# Patient Record
Sex: FEMALE | NOT HISPANIC OR LATINO | Employment: OTHER | URBAN - METROPOLITAN AREA
[De-identification: names, ages, dates, MRNs, and addresses within clinical notes are randomized per-mention and may not be internally consistent; named-entity substitution may affect disease eponyms.]

---

## 2018-05-21 PROBLEM — F31.9 BIPOLAR DISORDER: Status: ACTIVE | Noted: 2018-05-21

## 2018-05-21 PROBLEM — I10 HTN (HYPERTENSION): Status: ACTIVE | Noted: 2018-05-21

## 2018-05-21 PROBLEM — E78.2 MIXED HYPERLIPIDEMIA: Status: ACTIVE | Noted: 2018-05-21

## 2018-05-21 PROBLEM — R42 DIZZINESS: Status: ACTIVE | Noted: 2018-05-21

## 2018-05-21 PROBLEM — K21.9 GERD (GASTROESOPHAGEAL REFLUX DISEASE): Status: ACTIVE | Noted: 2018-05-21

## 2018-05-21 PROBLEM — E66.9 CLASS 2 OBESITY IN ADULT: Status: ACTIVE | Noted: 2018-05-21

## 2018-08-31 ENCOUNTER — TRANSCRIBE ORDERS (OUTPATIENT)
Dept: ADMINISTRATIVE | Facility: HOSPITAL | Age: 58
End: 2018-08-31

## 2018-08-31 DIAGNOSIS — R42 DIZZINESS: Primary | ICD-10-CM

## 2018-09-26 ENCOUNTER — HOSPITAL ENCOUNTER (OUTPATIENT)
Dept: CARDIOLOGY | Facility: HOSPITAL | Age: 58
Discharge: HOME | End: 2018-09-26
Admitting: FAMILY MEDICINE

## 2018-09-26 VITALS — SYSTOLIC BLOOD PRESSURE: 132 MMHG | DIASTOLIC BLOOD PRESSURE: 74 MMHG | HEART RATE: 91 BPM

## 2018-09-26 DIAGNOSIS — R42 DIZZINESS: ICD-10-CM

## 2018-09-26 LAB — BH CV TILT AGENT USED: NORMAL

## 2018-09-26 PROCEDURE — 93660 TILT TABLE EVALUATION: CPT | Performed by: INTERNAL MEDICINE

## 2018-09-26 PROCEDURE — 93660 TILT TABLE EVALUATION: CPT

## 2018-09-26 RX ORDER — HYDROCODONE BITARTRATE AND ACETAMINOPHEN 10; 325 MG/1; MG/1
1 TABLET ORAL EVERY 6 HOURS PRN
COMMUNITY

## 2018-09-26 RX ORDER — ATORVASTATIN CALCIUM 10 MG/1
80 TABLET, FILM COATED ORAL DAILY
COMMUNITY

## 2018-09-26 RX ORDER — FLUOXETINE HYDROCHLORIDE 20 MG/1
40 CAPSULE ORAL DAILY
COMMUNITY

## 2018-09-26 RX ORDER — LORAZEPAM 2 MG/1
2 TABLET ORAL EVERY 6 HOURS PRN
COMMUNITY
End: 2018-11-15

## 2018-09-26 RX ORDER — NITROGLYCERIN 400 UG/1
1 SPRAY ORAL
Status: DISCONTINUED | OUTPATIENT
Start: 2018-09-26 | End: 2018-09-27 | Stop reason: HOSPADM

## 2018-09-26 RX ORDER — RANITIDINE 150 MG/1
300 TABLET ORAL NIGHTLY
COMMUNITY

## 2018-09-26 RX ADMIN — NITROGLYCERIN 1 SPRAY: 400 SPRAY, METERED ORAL at 09:19

## 2018-11-14 PROBLEM — Z72.0 TOBACCO ABUSE: Status: ACTIVE | Noted: 2018-11-14

## 2018-11-14 PROBLEM — I10 HTN (HYPERTENSION): Status: RESOLVED | Noted: 2018-05-21 | Resolved: 2018-11-14

## 2018-11-14 NOTE — PROGRESS NOTES
Subjective   Katy Ward is a 58 y.o. female.  Brian Lynn MD Rowe, Brian Levin MD  [address] [id number]      Chief Complaint   Patient presents with   • Loss of Consciousness       Patient Active Problem List    Diagnosis Date Noted   • Syncope and collapse 05/21/2018     Priority: High     Note Last Updated: 11/15/2018     1.  Echo 4-24-17: Normal EF, normal valvular morphology    2.  Carotid duplex study 4-24-17, no significant disease    3.  Tilt table, positive vasopressor head up tilt table. 9-26-18         • HTN (hypertension) 05/21/2018     Priority: Medium   • Mixed hyperlipidemia 05/21/2018     Priority: Medium   • Tobacco abuse 11/14/2018     Priority: Low   • Class 2 obesity in adult 05/21/2018     Priority: Low   • Depressed 11/15/2018   • Anxiety 11/15/2018   • History of breast cancer 11/15/2018     Note Last Updated: 11/15/2018     Left mastectomy, chemo and radiation. With Reconstruction     • Bipolar disorder (CMS/HCC) 05/21/2018   • GERD (gastroesophageal reflux disease) 05/21/2018       Patient Active Problem List    Diagnosis   • Syncope and collapse [R55]   • HTN (hypertension) [I10]   • Mixed hyperlipidemia [E78.2]   • Tobacco abuse [Z72.0]   • Class 2 obesity in adult [E66.9]   • Depressed [F32.9]   • Anxiety [F41.9]   • History of breast cancer [Z85.3]   • Bipolar disorder (CMS/HCC) [F31.9]   • GERD (gastroesophageal reflux disease) [K21.9]        History of Present Illness   This is a 58-year-old hypertensive dyslipidemic female smoker with no significant prior cardiac history.  She has a long history of syncope and presyncope.  She had a stress test in 2013 that was unremarkable.  Last year she had an echocardiogram which is unremarkable.  She had a carotid duplex study which was unremarkable.  Recently she was sent by primary care for a tilt table study which was positive.  Labs from primary care are reviewed and reveal normal electrolytes and normal  hemoglobin and hematocrit.  Thyroid function is undocumented.  She describes episodes on a near daily basis of near syncope which she describes a sudden onset of dizziness, nausea, anxious feeling, tingling in the hands and heart racing.  She states she has chairs placed strategically around her house for her to sit in and rest.  These spells last for approximately 5 minutes.  She does not check her blood pressure at home.  Her last primary care note is reviewed and shows a resting heart rate of 98 bpm with blood pressure 124/86 and oxygen saturation 97%.  She denies orthopnea, PND, claudication, lower extremity edema.  Past Surgical History:   Procedure Laterality Date   • BREAST RECONSTRUCTION Left    • HYSTERECTOMY     • MASTECTOMY Left     cancer   • TUBAL ABDOMINAL LIGATION         The following portions of the patient's history were reviewed and updated as appropriate: allergies, current medications, past family history, past medical history, past social history, past surgical history and problem list.    Allergies   Allergen Reactions   • Codeine Itching     Itching.   • Phenergan [Promethazine Hcl] Anxiety     Hyper, itching.         Current Outpatient Medications:   •  amitriptyline (ELAVIL) 100 MG tablet, Take 100 mg by mouth 2 (Two) Times a Day., Disp: , Rfl:   •  atorvastatin (LIPITOR) 10 MG tablet, Take 80 mg by mouth Daily., Disp: , Rfl:   •  FLUoxetine (PROzac) 20 MG capsule, Take 40 mg by mouth Daily., Disp: , Rfl:   •  HYDROcodone-acetaminophen (NORCO)  MG per tablet, Take 1 tablet by mouth Every 6 (Six) Hours As Needed for Moderate Pain ., Disp: , Rfl:   •  lamoTRIgine (LaMICtal) 100 MG tablet, Take 150 mg by mouth 2 (Two) Times a Day., Disp: , Rfl:   •  omeprazole (priLOSEC) 40 MG capsule, Take 40 mg by mouth Daily., Disp: , Rfl:   •  ramipril (ALTACE) 10 MG capsule, Take 10 mg by mouth Daily., Disp: , Rfl:   •  raNITIdine (ZANTAC) 150 MG tablet, Take 300 mg by mouth Every Night., Disp: ,  Rfl:     Review of Systems   Constitution: Negative. Negative for diaphoresis, weakness, malaise/fatigue and weight gain.   HENT: Negative.    Eyes: Negative.    Cardiovascular: Negative for chest pain, claudication, dyspnea on exertion, irregular heartbeat, leg swelling, orthopnea, palpitations, paroxysmal nocturnal dyspnea and syncope.   Respiratory: Negative.  Negative for cough, shortness of breath, sleep disturbances due to breathing and wheezing.    Endocrine: Positive for heat intolerance and polyuria.   Hematologic/Lymphatic: Negative.  Negative for bleeding problem.   Skin: Negative.    Musculoskeletal: Negative.  Negative for muscle cramps, muscle weakness and myalgias.   Gastrointestinal: Negative.  Negative for heartburn.   Genitourinary: Negative.    Neurological: Negative.    Psychiatric/Behavioral: Positive for depression and memory loss.   Allergic/Immunologic: Negative.    All other systems reviewed and are negative.      Social History     Socioeconomic History   • Marital status:      Spouse name: Not on file   • Number of children: Not on file   • Years of education: Not on file   • Highest education level: Not on file   Social Needs   • Financial resource strain: Not on file   • Food insecurity - worry: Not on file   • Food insecurity - inability: Not on file   • Transportation needs - medical: Not on file   • Transportation needs - non-medical: Not on file   Occupational History   • Not on file   Tobacco Use   • Smoking status: Current Every Day Smoker     Packs/day: 1.00     Types: Cigarettes   • Smokeless tobacco: Never Used   Substance and Sexual Activity   • Alcohol use: No   • Drug use: No   • Sexual activity: Defer   Other Topics Concern   • Not on file   Social History Narrative   • Not on file       Family History   Problem Relation Age of Onset   • Heart disease Mother    • Heart disease Father    • Stroke Sister    • Hypertension Brother    • Heart disease Maternal Grandmother  "   • Heart disease Maternal Grandfather    • Heart disease Paternal Grandmother    • Heart disease Paternal Grandfather    • Hypertension Brother    • Arrhythmia Sister    • Hypertension Sister        Objective      Blood pressure 138/80, pulse 111, height 160 cm (63\"), weight 91.2 kg (201 lb).    Physical Exam   Constitutional: She is oriented to person, place, and time. She appears well-developed and well-nourished. No distress.   HENT:   Head: Normocephalic and atraumatic.   Mouth/Throat: Oropharynx is clear and moist.   Eyes: Pupils are equal, round, and reactive to light. No scleral icterus.   Neck: Neck supple. No JVD present. No tracheal deviation present. No thyromegaly present.   Cardiovascular: Normal rate, regular rhythm and normal heart sounds. Exam reveals no gallop and no friction rub.   No murmur heard.  Pulmonary/Chest: Effort normal and breath sounds normal. No respiratory distress. She has no wheezes. She has no rales.   Abdominal: Soft. Bowel sounds are normal. She exhibits no distension and no mass. There is no tenderness. There is no rebound and no guarding.   Musculoskeletal: Normal range of motion. She exhibits no edema or deformity.   Lymphadenopathy:     She has no cervical adenopathy.   Neurological: She is alert and oriented to person, place, and time. No cranial nerve deficit.   Skin: Skin is warm and dry. No rash noted. She is not diaphoretic.   Psychiatric: She has a normal mood and affect.   Nursing note and vitals reviewed.        ECG 12 Lead  Date/Time: 11/14/2018 3:01 PM  Performed by: Jordan Clayton MD  Authorized by: Jordan Clayton MD   Previous ECG: no previous ECG available  Rhythm: sinus tachycardia  Rate: normal  Conduction: incomplete RBBB  ST Segments: ST segments normal  T Waves: T waves normal  QRS axis: normal  Clinical impression: abnormal ECG  Comments: Nonspecific T wave changes  Rate 111            Lab Review: From primary care notes dated June 28, 2018  Fasting lipid " panel, total cholesterol 185, triglycerides 211, HDL 44, LDL 99      Assessment:     Diagnosis Plan   1. Syncope and collapse  She is instructed to increase her water intake     Holter Monitor - 48 Hour   2. Essential hypertension  Discontinue Altace    3. Mixed hyperlipidemia     4. Tachycardia  Toprol-XL 25 mg daily       Plan:  Patient with episodes of near syncope which she is able to manage by sitting and reclining.  She had a positive tilt table test for vasodepressive component.  Her previous cardiac workup including stress test and echocardiogram was unremarkable.  This time I have recommended maintenance of good hydration.  We will discontinue ramipril and start her on Toprol-XL 25 mg daily to address the reflex component of near syncope.  Cardiac monitor to assess for any possible arrhythmias.  Continue current medications.   Follow-up in 3 months at [facility], sooner as needed.  Thank you for allowing us to participate in the care of your patient.     Scribed for Jordan Clayton MD by Adam Patel PA-C. 11/15/2018  12:17 PM     IJordan MD, personally performed the services described in this documentation as scribed by the above named individual in my presence, and it is both accurate and complete.  11/15/2018  1:23 PM

## 2018-11-15 ENCOUNTER — CONSULT (OUTPATIENT)
Dept: CARDIOLOGY | Facility: CLINIC | Age: 58
End: 2018-11-15

## 2018-11-15 VITALS
SYSTOLIC BLOOD PRESSURE: 138 MMHG | HEART RATE: 111 BPM | HEIGHT: 63 IN | BODY MASS INDEX: 35.61 KG/M2 | DIASTOLIC BLOOD PRESSURE: 80 MMHG | WEIGHT: 201 LBS

## 2018-11-15 DIAGNOSIS — R55 SYNCOPE AND COLLAPSE: Primary | ICD-10-CM

## 2018-11-15 DIAGNOSIS — E78.2 MIXED HYPERLIPIDEMIA: ICD-10-CM

## 2018-11-15 DIAGNOSIS — I10 ESSENTIAL HYPERTENSION: ICD-10-CM

## 2018-11-15 DIAGNOSIS — R00.0 TACHYCARDIA: ICD-10-CM

## 2018-11-15 PROBLEM — F32.A DEPRESSED: Status: ACTIVE | Noted: 2018-11-15

## 2018-11-15 PROBLEM — F41.9 ANXIETY: Status: ACTIVE | Noted: 2018-11-15

## 2018-11-15 PROBLEM — Z85.3 HISTORY OF BREAST CANCER: Status: ACTIVE | Noted: 2018-11-15

## 2018-11-15 PROBLEM — C80.1 CANCER: Status: ACTIVE | Noted: 2018-11-15

## 2018-11-15 PROCEDURE — 99204 OFFICE O/P NEW MOD 45 MIN: CPT | Performed by: INTERNAL MEDICINE

## 2018-11-15 RX ORDER — RAMIPRIL 10 MG/1
10 CAPSULE ORAL DAILY
COMMUNITY
End: 2018-11-15

## 2018-11-15 RX ORDER — OMEPRAZOLE 40 MG/1
40 CAPSULE, DELAYED RELEASE ORAL DAILY
COMMUNITY

## 2018-11-15 RX ORDER — AMITRIPTYLINE HYDROCHLORIDE 100 MG/1
100 TABLET, FILM COATED ORAL 2 TIMES DAILY
COMMUNITY

## 2018-11-15 RX ORDER — LAMOTRIGINE 100 MG/1
150 TABLET ORAL 2 TIMES DAILY
COMMUNITY

## 2018-11-15 RX ORDER — METOPROLOL SUCCINATE 25 MG/1
25 TABLET, EXTENDED RELEASE ORAL DAILY
Qty: 30 TABLET | Refills: 11 | Status: SHIPPED | OUTPATIENT
Start: 2018-11-15

## 2018-11-16 ENCOUNTER — TELEPHONE (OUTPATIENT)
Dept: CARDIOLOGY | Facility: CLINIC | Age: 58
End: 2018-11-16

## 2018-12-06 NOTE — TELEPHONE ENCOUNTER
Patient never called back with blood pressure reading as instructed to do and I have tried to call her and left messages to call with no return.   Also she was a no show for a fu.

## 2019-02-19 ENCOUNTER — OFFICE VISIT (OUTPATIENT)
Dept: CARDIOLOGY | Facility: CLINIC | Age: 59
End: 2019-02-19

## 2019-02-19 VITALS
DIASTOLIC BLOOD PRESSURE: 77 MMHG | WEIGHT: 201 LBS | SYSTOLIC BLOOD PRESSURE: 120 MMHG | HEIGHT: 63 IN | HEART RATE: 83 BPM | BODY MASS INDEX: 35.61 KG/M2

## 2019-02-19 DIAGNOSIS — I10 ESSENTIAL HYPERTENSION: ICD-10-CM

## 2019-02-19 DIAGNOSIS — R55 SYNCOPE AND COLLAPSE: Primary | ICD-10-CM

## 2019-02-19 DIAGNOSIS — E78.2 MIXED HYPERLIPIDEMIA: ICD-10-CM

## 2019-02-19 PROCEDURE — 99214 OFFICE O/P EST MOD 30 MIN: CPT | Performed by: INTERNAL MEDICINE

## 2019-02-19 RX ORDER — FLUDROCORTISONE ACETATE 0.1 MG/1
0.1 TABLET ORAL DAILY
Qty: 90 TABLET | Refills: 3 | Status: SHIPPED | OUTPATIENT
Start: 2019-02-19 | End: 2020-02-21

## 2019-02-19 NOTE — PROGRESS NOTES
"        Encounter Date:02/19/2019      Patient ID: Katy Ward is a 58 y.o. female.    Chief Complaint: Dizziness and Syncope      PROBLEM LIST:  1. Syncope and collapse:  a. Echo, 04/24/17: Normal EF, normal valvular morphology  b. Carotid duplex study 4-24-17, no significant disease  c. Tilt table, positive vasopressor head up tilt table. 9-26-18  d. 48h Holter monitor, 11/15/2018: SR with occasional PAC's.  2. Hypertension.  3. Hyperlipidemia.  4. Tobacco abuse.  5. Class 2 obesity in adult.  6. Depression/anxiety.  7. Bipolar disorder.  8. H/o breast cancer:  a. Left mastectomy, chemo and radiation. With reconstruction.  9. GERD.    History of Present Illness  Patient presents today for 3 month follow-up with a history of syncope and cardiac risk factors. Since last visit, she has been experiencing \"good days and bad days\", with some severe intermittent dizziness. Denies chest pain, shortness of breath, leg swelling, palpitations, and syncope. She feels that metoprolol has not been helping with her symptoms.    Allergies   Allergen Reactions   • Codeine Itching     Itching.   • Phenergan [Promethazine Hcl] Anxiety     Hyper, itching.         Current Outpatient Medications:   •  amitriptyline (ELAVIL) 100 MG tablet, Take 100 mg by mouth 2 (Two) Times a Day., Disp: , Rfl:   •  atorvastatin (LIPITOR) 10 MG tablet, Take 80 mg by mouth Daily., Disp: , Rfl:   •  FLUoxetine (PROzac) 20 MG capsule, Take 40 mg by mouth Daily., Disp: , Rfl:   •  HYDROcodone-acetaminophen (NORCO)  MG per tablet, Take 1 tablet by mouth Every 6 (Six) Hours As Needed for Moderate Pain ., Disp: , Rfl:   •  lamoTRIgine (LaMICtal) 100 MG tablet, Take 150 mg by mouth 2 (Two) Times a Day., Disp: , Rfl:   •  metoprolol succinate XL (TOPROL-XL) 25 MG 24 hr tablet, Take 1 tablet by mouth Daily., Disp: 30 tablet, Rfl: 11  •  omeprazole (priLOSEC) 40 MG capsule, Take 40 mg by mouth Daily., Disp: , Rfl:   •  raNITIdine (ZANTAC) 150 MG tablet, " "Take 300 mg by mouth Every Night., Disp: , Rfl:     The following portions of the patient's history were reviewed and updated as appropriate: allergies, current medications, past family history, past medical history, past social history, past surgical history and problem list.    ROS  Review of Systems   Constitution: Negative for chills, fatigue, fever, generalized weakness, weight gain and weight loss.   Cardiovascular: Negative for chest pain, claudication, dyspnea on exertion, leg swelling, orthopnea, palpitations, and paroxysmal nocturnal dyspnea.    Respiratory: Negative for cough, shortness of breath, snoring, and wheezing.  HENT: Negative for ear pain, nosebleeds, and tinnitus.  Gastrointestinal: Negative for abdominal pain, constipation, diarrhea, nausea and vomiting.   Genitourinary: No urinary symptoms   Neurological: Negative for headaches, loss of balance, numbness, and symptoms of stroke. Positive for dizziness.  Psychiatric: Normal mental status.     All other systems reviewed and are negative.    Objective:     /77 (BP Location: Right arm, Patient Position: Sitting)   Pulse 83   Ht 160 cm (63\")   Wt 91.2 kg (201 lb)   BMI 35.61 kg/m²        Physical Exam  Constitutional: Patient appears well-developed and well-nourished.   HENT: HEENT exam unremarkable.   Neck: Neck supple. No JVD present. No carotid bruits.   Cardiovascular: Normal rate, regular rhythm and normal heart sounds. No murmur heard.   2+ symmetric pulses.   Pulmonary/Chest: Breath sounds normal. Does not exhibit tenderness.   Abdominal: Abdomen benign.   Musculoskeletal: Does not exhibit edema.   Neurological: Neurological exam unremarkable.   Vitals reviewed.    Lab Review:     Last lipid panel, 06/28/2018:  Chol 185  Trig 211  HDL 44  LDL 99    Procedures       Assessment:      Diagnosis Plan   1. Syncope and collapse  Start florinef 0.1 mg to help patient retain fluid, in an attempt to help with her dizziness and syncopal " episodes.   2. Essential hypertension  Well-controlled, continue current medications.   3. Mixed hyperlipidemia  Well-controlled, continue atorvastatin 10 mg.     Plan:   Start florinef 0.1 mg to help patient retain fluid, in an attempt to help with her dizziness and syncopal episodes.  Maintain good hydration.  Discontinue metoprolol as it is not helping with the symptoms.  Stable cardiac status.  Patient advised to be cautious and events of dizziness to avoid syncope.  Continue rest of the current medications.   FU in 3 MO, sooner as needed.  Thank you for allowing us to participate in the care of your patient.     Scribed for Jordan Clayton MD by Sade Estrada. 2/19/2019  2:01 PM      I, Jordan Clayton MD, personally performed the services described in this documentation as scribed by the above named individual in my presence, and it is both accurate and complete.  2/19/2019  2:38 PM        Please note that portions of this note may have been completed with a voice recognition program. Efforts were made to edit the dictations, but occasionally words are mistranscribed.

## 2019-02-26 ENCOUNTER — TELEPHONE (OUTPATIENT)
Dept: CARDIOLOGY | Facility: CLINIC | Age: 59
End: 2019-02-26

## 2019-02-26 NOTE — TELEPHONE ENCOUNTER
Patient states that since starting florinef her symptoms of dizziness and syncope have improved, but now she is having BLE edema with pitting.  She does not have any BP/HR to readings to provide.  She was started on florinef 01.mg daily.    Would you like to make any changes?  Please advise.

## 2020-02-21 RX ORDER — FLUDROCORTISONE ACETATE 0.1 MG/1
0.1 TABLET ORAL DAILY
Qty: 90 TABLET | Refills: 3 | Status: SHIPPED | OUTPATIENT
Start: 2020-02-21

## 2023-03-30 ENCOUNTER — TRANSCRIBE ORDERS (OUTPATIENT)
Dept: ADMINISTRATIVE | Facility: HOSPITAL | Age: 63
End: 2023-03-30
Payer: COMMERCIAL

## 2023-03-30 ENCOUNTER — LAB (OUTPATIENT)
Dept: LAB | Facility: HOSPITAL | Age: 63
End: 2023-03-30
Payer: COMMERCIAL

## 2023-03-30 DIAGNOSIS — C79.81 SECONDARY MALIGNANT NEOPLASM OF BREAST: ICD-10-CM

## 2023-03-30 DIAGNOSIS — C79.81 SECONDARY MALIGNANT NEOPLASM OF BREAST: Primary | ICD-10-CM

## 2023-03-30 LAB
ANISOCYTOSIS BLD QL: NORMAL
BASOPHILS # BLD AUTO: 0.03 10*3/MM3 (ref 0–0.2)
BASOPHILS NFR BLD AUTO: 1.1 % (ref 0–1.5)
DACRYOCYTES BLD QL SMEAR: NORMAL
DEPRECATED RDW RBC AUTO: 86.5 FL (ref 37–54)
EOSINOPHIL # BLD AUTO: 0 10*3/MM3 (ref 0–0.4)
EOSINOPHIL NFR BLD AUTO: 0 % (ref 0.3–6.2)
ERYTHROCYTE [DISTWIDTH] IN BLOOD BY AUTOMATED COUNT: 23.9 % (ref 12.3–15.4)
HCT VFR BLD AUTO: 25.3 % (ref 34–46.6)
HGB BLD-MCNC: 7.8 G/DL (ref 12–15.9)
HYPOCHROMIA BLD QL: NORMAL
IMM GRANULOCYTES # BLD AUTO: 0.01 10*3/MM3 (ref 0–0.05)
IMM GRANULOCYTES NFR BLD AUTO: 0.4 % (ref 0–0.5)
LYMPHOCYTES # BLD AUTO: 1.61 10*3/MM3 (ref 0.7–3.1)
LYMPHOCYTES NFR BLD AUTO: 58.1 % (ref 19.6–45.3)
MCH RBC QN AUTO: 32 PG (ref 26.6–33)
MCHC RBC AUTO-ENTMCNC: 30.8 G/DL (ref 31.5–35.7)
MCV RBC AUTO: 103.7 FL (ref 79–97)
MONOCYTES # BLD AUTO: 0.19 10*3/MM3 (ref 0.1–0.9)
MONOCYTES NFR BLD AUTO: 6.9 % (ref 5–12)
NEUTROPHILS NFR BLD AUTO: 0.93 10*3/MM3 (ref 1.7–7)
NEUTROPHILS NFR BLD AUTO: 33.5 % (ref 42.7–76)
NRBC BLD AUTO-RTO: 0 /100 WBC (ref 0–0.2)
PLAT MORPH BLD: NORMAL
PLATELET # BLD AUTO: 161 10*3/MM3 (ref 140–450)
PMV BLD AUTO: 10.9 FL (ref 6–12)
POLYCHROMASIA BLD QL SMEAR: NORMAL
RBC # BLD AUTO: 2.44 10*6/MM3 (ref 3.77–5.28)
WBC NRBC COR # BLD: 2.77 10*3/MM3 (ref 3.4–10.8)

## 2023-03-30 PROCEDURE — 85007 BL SMEAR W/DIFF WBC COUNT: CPT

## 2023-03-30 PROCEDURE — 85025 COMPLETE CBC W/AUTO DIFF WBC: CPT

## 2023-03-30 PROCEDURE — 36415 COLL VENOUS BLD VENIPUNCTURE: CPT

## 2023-05-04 ENCOUNTER — LAB (OUTPATIENT)
Dept: LAB | Facility: HOSPITAL | Age: 63
End: 2023-05-04
Payer: COMMERCIAL

## 2023-05-04 ENCOUNTER — TRANSCRIBE ORDERS (OUTPATIENT)
Dept: ADMINISTRATIVE | Facility: HOSPITAL | Age: 63
End: 2023-05-04
Payer: COMMERCIAL

## 2023-05-04 DIAGNOSIS — C79.51 SECONDARY MALIGNANT NEOPLASM OF BONE AND BONE MARROW: ICD-10-CM

## 2023-05-04 DIAGNOSIS — C79.52 SECONDARY MALIGNANT NEOPLASM OF BONE AND BONE MARROW: ICD-10-CM

## 2023-05-04 DIAGNOSIS — C50.912 MALIGNANT NEOPLASM OF LEFT FEMALE BREAST, UNSPECIFIED ESTROGEN RECEPTOR STATUS, UNSPECIFIED SITE OF BREAST: Primary | ICD-10-CM

## 2023-05-04 DIAGNOSIS — S31.109D: ICD-10-CM

## 2023-05-04 DIAGNOSIS — C50.912 MALIGNANT NEOPLASM OF LEFT FEMALE BREAST, UNSPECIFIED ESTROGEN RECEPTOR STATUS, UNSPECIFIED SITE OF BREAST: ICD-10-CM

## 2023-05-04 PROCEDURE — 85025 COMPLETE CBC W/AUTO DIFF WBC: CPT

## 2023-05-04 PROCEDURE — 36415 COLL VENOUS BLD VENIPUNCTURE: CPT

## 2023-05-05 LAB
BASOPHILS # BLD AUTO: 0.05 10*3/MM3 (ref 0–0.2)
BASOPHILS NFR BLD AUTO: 1.4 % (ref 0–1.5)
DEPRECATED RDW RBC AUTO: 83.6 FL (ref 37–54)
EOSINOPHIL # BLD AUTO: 0.01 10*3/MM3 (ref 0–0.4)
EOSINOPHIL NFR BLD AUTO: 0.3 % (ref 0.3–6.2)
ERYTHROCYTE [DISTWIDTH] IN BLOOD BY AUTOMATED COUNT: 22 % (ref 12.3–15.4)
HCT VFR BLD AUTO: 26.2 % (ref 34–46.6)
HGB BLD-MCNC: 8.5 G/DL (ref 12–15.9)
IMM GRANULOCYTES # BLD AUTO: 0.01 10*3/MM3 (ref 0–0.05)
IMM GRANULOCYTES NFR BLD AUTO: 0.3 % (ref 0–0.5)
LYMPHOCYTES # BLD AUTO: 1.5 10*3/MM3 (ref 0.7–3.1)
LYMPHOCYTES NFR BLD AUTO: 41.7 % (ref 19.6–45.3)
MCH RBC QN AUTO: 34 PG (ref 26.6–33)
MCHC RBC AUTO-ENTMCNC: 32.4 G/DL (ref 31.5–35.7)
MCV RBC AUTO: 104.8 FL (ref 79–97)
MONOCYTES # BLD AUTO: 0.58 10*3/MM3 (ref 0.1–0.9)
MONOCYTES NFR BLD AUTO: 16.1 % (ref 5–12)
NEUTROPHILS NFR BLD AUTO: 1.45 10*3/MM3 (ref 1.7–7)
NEUTROPHILS NFR BLD AUTO: 40.2 % (ref 42.7–76)
NRBC BLD AUTO-RTO: 0.6 /100 WBC (ref 0–0.2)
PLATELET # BLD AUTO: 242 10*3/MM3 (ref 140–450)
PMV BLD AUTO: 10.4 FL (ref 6–12)
RBC # BLD AUTO: 2.5 10*6/MM3 (ref 3.77–5.28)
WBC NRBC COR # BLD: 3.6 10*3/MM3 (ref 3.4–10.8)

## 2023-05-25 ENCOUNTER — LAB (OUTPATIENT)
Dept: LAB | Facility: HOSPITAL | Age: 63
End: 2023-05-25
Payer: COMMERCIAL

## 2023-05-25 ENCOUNTER — TRANSCRIBE ORDERS (OUTPATIENT)
Dept: ADMINISTRATIVE | Facility: HOSPITAL | Age: 63
End: 2023-05-25

## 2023-05-25 DIAGNOSIS — I10 ESSENTIAL HYPERTENSION, MALIGNANT: ICD-10-CM

## 2023-05-25 DIAGNOSIS — C50.912 MALIGNANT NEOPLASM OF LEFT FEMALE BREAST, UNSPECIFIED ESTROGEN RECEPTOR STATUS, UNSPECIFIED SITE OF BREAST: ICD-10-CM

## 2023-05-25 DIAGNOSIS — C50.912 MALIGNANT NEOPLASM OF LEFT FEMALE BREAST, UNSPECIFIED ESTROGEN RECEPTOR STATUS, UNSPECIFIED SITE OF BREAST: Primary | ICD-10-CM

## 2023-05-25 LAB
ANISOCYTOSIS BLD QL: NORMAL
BASOPHILS # BLD AUTO: 0.04 10*3/MM3 (ref 0–0.2)
BASOPHILS NFR BLD AUTO: 1.4 % (ref 0–1.5)
DEPRECATED RDW RBC AUTO: 86.4 FL (ref 37–54)
EOSINOPHIL # BLD AUTO: 0.01 10*3/MM3 (ref 0–0.4)
EOSINOPHIL NFR BLD AUTO: 0.4 % (ref 0.3–6.2)
ERYTHROCYTE [DISTWIDTH] IN BLOOD BY AUTOMATED COUNT: 20.4 % (ref 12.3–15.4)
HCT VFR BLD AUTO: 31.9 % (ref 34–46.6)
HGB BLD-MCNC: 9.8 G/DL (ref 12–15.9)
HYPOCHROMIA BLD QL: NORMAL
IMM GRANULOCYTES # BLD AUTO: 0 10*3/MM3 (ref 0–0.05)
IMM GRANULOCYTES NFR BLD AUTO: 0 % (ref 0–0.5)
LYMPHOCYTES # BLD AUTO: 1.49 10*3/MM3 (ref 0.7–3.1)
LYMPHOCYTES NFR BLD AUTO: 54 % (ref 19.6–45.3)
MCH RBC QN AUTO: 35.9 PG (ref 26.6–33)
MCHC RBC AUTO-ENTMCNC: 30.7 G/DL (ref 31.5–35.7)
MCV RBC AUTO: 116.8 FL (ref 79–97)
MONOCYTES # BLD AUTO: 0.24 10*3/MM3 (ref 0.1–0.9)
MONOCYTES NFR BLD AUTO: 8.7 % (ref 5–12)
NEUTROPHILS NFR BLD AUTO: 0.98 10*3/MM3 (ref 1.7–7)
NEUTROPHILS NFR BLD AUTO: 35.5 % (ref 42.7–76)
NRBC BLD AUTO-RTO: 0 /100 WBC (ref 0–0.2)
PLAT MORPH BLD: NORMAL
PLATELET # BLD AUTO: 167 10*3/MM3 (ref 140–450)
PMV BLD AUTO: 12 FL (ref 6–12)
RBC # BLD AUTO: 2.73 10*6/MM3 (ref 3.77–5.28)
WBC NRBC COR # BLD: 2.76 10*3/MM3 (ref 3.4–10.8)

## 2023-05-25 PROCEDURE — 85025 COMPLETE CBC W/AUTO DIFF WBC: CPT

## 2023-05-25 PROCEDURE — 85007 BL SMEAR W/DIFF WBC COUNT: CPT

## 2023-05-25 PROCEDURE — 36415 COLL VENOUS BLD VENIPUNCTURE: CPT

## 2023-07-27 ENCOUNTER — LAB (OUTPATIENT)
Dept: LAB | Facility: HOSPITAL | Age: 63
End: 2023-07-27
Payer: COMMERCIAL

## 2023-07-27 ENCOUNTER — TRANSCRIBE ORDERS (OUTPATIENT)
Dept: ADMINISTRATIVE | Facility: HOSPITAL | Age: 63
End: 2023-07-27
Payer: COMMERCIAL

## 2023-07-27 DIAGNOSIS — I10 ESSENTIAL HYPERTENSION, MALIGNANT: ICD-10-CM

## 2023-07-27 DIAGNOSIS — C50.912 MALIGNANT NEOPLASM OF LEFT FEMALE BREAST, UNSPECIFIED ESTROGEN RECEPTOR STATUS, UNSPECIFIED SITE OF BREAST: ICD-10-CM

## 2023-07-27 DIAGNOSIS — C50.912 MALIGNANT NEOPLASM OF LEFT FEMALE BREAST, UNSPECIFIED ESTROGEN RECEPTOR STATUS, UNSPECIFIED SITE OF BREAST: Primary | ICD-10-CM

## 2023-07-27 PROCEDURE — 85025 COMPLETE CBC W/AUTO DIFF WBC: CPT

## 2023-07-27 PROCEDURE — 36415 COLL VENOUS BLD VENIPUNCTURE: CPT

## 2023-07-27 PROCEDURE — 85007 BL SMEAR W/DIFF WBC COUNT: CPT

## 2023-07-28 LAB
ANISOCYTOSIS BLD QL: ABNORMAL
BASOPHILS # BLD MANUAL: 0.04 10*3/MM3 (ref 0–0.2)
BASOPHILS NFR BLD MANUAL: 1 % (ref 0–1.5)
DACRYOCYTES BLD QL SMEAR: ABNORMAL
DEPRECATED RDW RBC AUTO: 65.1 FL (ref 37–54)
EOSINOPHIL # BLD MANUAL: 0.04 10*3/MM3 (ref 0–0.4)
EOSINOPHIL NFR BLD MANUAL: 1 % (ref 0.3–6.2)
ERYTHROCYTE [DISTWIDTH] IN BLOOD BY AUTOMATED COUNT: 16.1 % (ref 12.3–15.4)
HCT VFR BLD AUTO: 32.1 % (ref 34–46.6)
HGB BLD-MCNC: 10.7 G/DL (ref 12–15.9)
LYMPHOCYTES # BLD MANUAL: 1.41 10*3/MM3 (ref 0.7–3.1)
LYMPHOCYTES NFR BLD MANUAL: 1 % (ref 5–12)
MCH RBC QN AUTO: 36.5 PG (ref 26.6–33)
MCHC RBC AUTO-ENTMCNC: 33.3 G/DL (ref 31.5–35.7)
MCV RBC AUTO: 109.6 FL (ref 79–97)
MICROCYTES BLD QL: ABNORMAL
MONOCYTES # BLD: 0.04 10*3/MM3 (ref 0.1–0.9)
NEUTROPHILS # BLD AUTO: 2.39 10*3/MM3 (ref 1.7–7)
NEUTROPHILS NFR BLD MANUAL: 61 % (ref 42.7–76)
PLATELET # BLD AUTO: 252 10*3/MM3 (ref 140–450)
PMV BLD AUTO: 10.7 FL (ref 6–12)
POIKILOCYTOSIS BLD QL SMEAR: ABNORMAL
POLYCHROMASIA BLD QL SMEAR: ABNORMAL
RBC # BLD AUTO: 2.93 10*6/MM3 (ref 3.77–5.28)
SMALL PLATELETS BLD QL SMEAR: ADEQUATE
SPHEROCYTES BLD QL SMEAR: ABNORMAL
VARIANT LYMPHS NFR BLD MANUAL: 36 % (ref 19.6–45.3)
WBC MORPH BLD: NORMAL
WBC NRBC COR # BLD: 3.91 10*3/MM3 (ref 3.4–10.8)